# Patient Record
Sex: FEMALE | Race: WHITE
[De-identification: names, ages, dates, MRNs, and addresses within clinical notes are randomized per-mention and may not be internally consistent; named-entity substitution may affect disease eponyms.]

---

## 2018-05-24 ENCOUNTER — HOSPITAL ENCOUNTER (EMERGENCY)
Dept: HOSPITAL 74 - FER | Age: 33
Discharge: HOME | End: 2018-05-24
Payer: COMMERCIAL

## 2018-05-24 VITALS — HEART RATE: 84 BPM | SYSTOLIC BLOOD PRESSURE: 115 MMHG | TEMPERATURE: 98.5 F | DIASTOLIC BLOOD PRESSURE: 89 MMHG

## 2018-05-24 VITALS — BODY MASS INDEX: 20.9 KG/M2

## 2018-05-24 DIAGNOSIS — G43.009: Primary | ICD-10-CM

## 2018-05-24 LAB
ALBUMIN SERPL-MCNC: 4.3 G/DL (ref 3.5–5)
ALP SERPL-CCNC: 60 U/L (ref 32–92)
ALT SERPL-CCNC: 17 U/L (ref 10–40)
ANION GAP SERPL CALC-SCNC: 6 MMOL/L (ref 8–16)
AST SERPL-CCNC: 21 U/L (ref 10–42)
BASOPHILS # BLD: 0.4 % (ref 0–2)
BILIRUB SERPL-MCNC: < 0.5 MG/DL (ref 0.2–1)
BUN SERPL-MCNC: 16 MG/DL (ref 7–18)
CALCIUM SERPL-MCNC: 9.8 MG/DL (ref 8.4–10.2)
CHLORIDE SERPL-SCNC: 99 MMOL/L (ref 98–107)
CO2 SERPL-SCNC: 31 MMOL/L (ref 22–28)
CREAT SERPL-MCNC: < 0.8 MG/DL (ref 0.6–1.3)
DEPRECATED RDW RBC AUTO: 10.9 % (ref 11.6–15.6)
EOSINOPHIL # BLD: 0.5 % (ref 0–4.5)
GLUCOSE SERPL-MCNC: 84 MG/DL (ref 74–106)
HCT VFR BLD CALC: 39.3 % (ref 32.4–45.2)
HGB BLD-MCNC: 14 GM/DL (ref 10.7–15.3)
LYMPHOCYTES # BLD: 16.8 % (ref 8–40)
MCH RBC QN AUTO: 34.8 PG (ref 25.7–33.7)
MCHC RBC AUTO-ENTMCNC: 35.7 G/DL (ref 32–36)
MCV RBC: 97.5 FL (ref 80–96)
MONOCYTES # BLD AUTO: 6.2 % (ref 3.8–10.2)
NEUTROPHILS # BLD: 76.1 % (ref 42.8–82.8)
PLATELET # BLD AUTO: 325 K/MM3 (ref 134–434)
PMV BLD: 7.9 FL (ref 7.5–11.1)
POTASSIUM SERPLBLD-SCNC: 3.7 MMOL/L (ref 3.5–5.1)
PROT SERPL-MCNC: 6.8 G/DL (ref 6.4–8.3)
RBC # BLD AUTO: 4.03 M/MM3 (ref 3.6–5.2)
SODIUM SERPL-SCNC: 136 MMOL/L (ref 136–145)
WBC # BLD AUTO: 13.9 K/MM3 (ref 4–10.8)

## 2018-05-24 PROCEDURE — 3E033GC INTRODUCTION OF OTHER THERAPEUTIC SUBSTANCE INTO PERIPHERAL VEIN, PERCUTANEOUS APPROACH: ICD-10-PCS

## 2018-05-24 PROCEDURE — 3E0337Z INTRODUCTION OF ELECTROLYTIC AND WATER BALANCE SUBSTANCE INTO PERIPHERAL VEIN, PERCUTANEOUS APPROACH: ICD-10-PCS

## 2018-05-24 PROCEDURE — 3E0333Z INTRODUCTION OF ANTI-INFLAMMATORY INTO PERIPHERAL VEIN, PERCUTANEOUS APPROACH: ICD-10-PCS

## 2018-05-24 NOTE — PDOC
History of Present Illness





- General


History Source: Patient, Spouse


Exam Limitations: No Limitations





- History of Present Illness


Initial Comments: 





05/24/18 15:36


The patient is a 32 year old female accompanied with her spouse, with no 

significant past medical history, who presents to the emergency department for 

evaluation of migraines. The patient reports moderate migraine pain for  1 

week. The patient reports her migraine started on her vacation in [place] 

last Thursday 5/17. The patient reports visiting an urgent care in [place] 

and was given Toradol on Saturday. She reports visiting an urgent care upon her 

return from vacation on Monday for her headache and blurry vision and was 

advised to follow up with her neurologist. The patient reports visiting her 

neurologist (Dr. Grossman) on Tuesday and was given steroids/fioricet. The 

patient reports associated symptoms of headache and photophobia. The patient 

denies loss of consciousness. 





Allergies: NKDA 


Social History: Alcohol consumption reported (daily glass of wine). No reported 

cigarette smoking or drug use. 


Surgical History: Kidney stone, knee.








<Ruth Briscoe - Last Filed: 05/24/18 15:36>





<Elena Maldonado S - Last Filed: 05/24/18 17:44>





- General


Chief Complaint: Migraine Headache


Stated Complaint: migraine


Time Seen by Provider: 05/24/18 14:08





Past History





<Ruth Briscoe - Last Filed: 05/24/18 15:36>





- Past Medical History


COPD: No


DVT: No





- Suicide/Smoking/Psychosocial Hx


Smoking History: Never smoked


Have you smoked in the past 12 months: No


Hx Alcohol Use: Yes


Drug/Substance Use Hx: No


Substance Use Type: Alcohol





<Elena Maldonado S - Last Filed: 05/24/18 17:44>





- Past Medical History


Allergies/Adverse Reactions: 


 Allergies











Allergy/AdvReac Type Severity Reaction Status Date / Time


 


No Known Allergies Allergy   Unverified 05/19/14 22:52











Home Medications: 


Ambulatory Orders





Diphenhydramine HCl [Benadryl -] 25 mg PO Q8H #21 capsule 05/24/18 


Naproxen [Naprosyn] 500 mg PO BID #20 tablet 05/24/18 


Ondansetron [Ondansetron Odt] 8 mg PO BID #14 tab.rapdis 05/24/18 











**Review of Systems





- Review of Systems


Able to Perform ROS?: Yes


Is the patient limited English proficient: No


Constitutional: No: Symptoms Reported, See HPI, Chills, Diaphoresis, Fever, 

Loss of Appetite, Malaise, Night Sweats, Weakness, Weight Stable, Unintentional 

Wgt. Loss, Unexplained wgt Loss, Other


HEENTM: No: Symptoms Reported, See HPI, Eye Pain, Blurred Vision, Tearing, 

Recent change in vision, Double Vision, Cataracts, Ear Pain, Ocular Prothesis, 

Ear Discharge, Nose Pain, Nose Congestion, Tinnitus, Nose Bleeding, Hearing Loss

, Throat Pain, Throat Swelling, Mouth Pain, Dental Problems, Difficulty 

Swallowing, Mouth Swelling, Other


Respiratory: No: Orthopnea, Shortness of Breath, SOB with Exertion, SOB at Rest

, Stridor, Wheezing, Productive cough


Cardiac (ROS): No: Chest Pain, Chest Tightness


ABD/GI: No: Abdominal Distended, Abd. Pain w/ defecation, Blood Streaked Bowels

, Constipated, Diarrhea, Difficulty Swallowing, Nausea, Poor Appetite, Poor 

Fluid Intake, Rectal Bleeding, Vomiting, Abdominal cramping, Tarry Stools


: No: Burning, Dysuria, Discharge, Frequency, Flank Pain, Hematuria, 

Incontinence, Urgency


Musculoskeletal: No: Symptoms Reported, See HPI, Back Pain, Gout, Joint Pain, 

Joint Swelling, Muscle Pain, Muscle Weakness, Neck Pain, Joint Stiffness, Other


Integumentary: No: Symptoms Reported, See HPI, Bruising, Change in Color, 

Change in Hair/Nails, Dryness, Erythema, Flushing, Lesions, Lumps, Pallor, 

Pruritus, Rash, Sweating, Other


Neurological: Yes: Headache.  No: Symptoms reported, See HPI, Numbness, 

Paresthesia, Pre-Existing Deficit, Seizure, Tingling, Tremors, Weakness, 

Unsteady Gait, Ataxia, Dizziness, Other


Psychiatric: No: Anxiety, Depression, Sleep Pattern Change


Endocrine: No: Excessive Sweating, Flushing





<Ruth Briscoe - Last Filed: 05/24/18 15:36>





*Physical Exam





- Vital Signs


 Last Vital Signs











Temp Pulse Resp BP Pulse Ox


 


 98.5 F   84   16   115/89   100 


 


 05/24/18 14:04  05/24/18 14:04  05/24/18 14:04  05/24/18 14:04  05/24/18 14:04














- Physical Exam


Comments: 


GENERAL:


Well developed, well nourished. Awake and alert. No acute distress.


HEENT:


Normocephalic, atraumatic. PERRLA, EOMI. No conjunctival pallor. Sclera are non-

icteric. Moist mucous membranes. Oropharynx is clear.


NECK: 


Supple. Full ROM. No JVD. Carotid pulses 2+ and symmetric, without bruits. No 

thyromegaly. No lymphadenopathy.


CARDIOVASCULAR:


Regular rate and rhythm. No murmurs, rubs, or gallops. Distal pulses are 2+ and 

symmetric. 


PULMONARY: 


No evidence of respiratory distress. Lungs clear to auscultation bilaterally. 

No wheezing, rales or rhonchi.


ABDOMINAL:


Soft. Non-tender. Non-distended. No rebound or guarding. No organomegaly. 

Normoactive bowel sounds. 


MUSCULOSKELETAL 


Normal range of motion at all joints. No bony deformities or tenderness. No CVA 

tenderness.


EXTREMITIES: 


No cyanosis. No clubbing. No edema. No calf tenderness.


SKIN: 


Warm and dry. Normal capillary refill. No rashes. No jaundice. 


NEUROLOGICAL: 


Alert, awake, appropriate. Cranial nerves 2-12 intact. No deficits to light 

touch and temperature in face, upper extremities and lower extremities. No 

motor deficits in the in face, upper extremities and lower extremities. 

Normoreflexic in the upper and lower extremities. Normal speech. Toes are down-

going bilaterally. 


PSYCHIATRIC: 


Cooperative. Good eye contact. Appropriate mood and affect.





General Appearance: Yes: Nourished, Appropriately Dressed


HEENT: positive: EOMI, HENRIQUE, Normal ENT Inspection, Normal Voice, TMs Normal, 

Pharynx Normal


Neck: positive: Supple


Respiratory/Chest: positive: Lungs Clear, Normal Breath Sounds


Cardiovascular: positive: Regular Rhythm, Regular Rate


Gastrointestinal/Abdominal: positive: Normal Bowel Sounds, Soft


Musculoskeletal: positive: Normal Inspection.  negative: CVA Tenderness


Extremity: positive: Normal Capillary Refill, Normal Inspection, Normal Range 

of Motion


Integumentary: positive: Normal Color, Warm


Neurologic: positive: CNs II-XII NML intact, Fully Oriented, Alert, Normal Mood/

Affect, Normal Response, Motor Strength 5/5





<Ruth Briscoe - Last Filed: 05/24/18 15:36>





- Vital Signs


 Last Vital Signs











Temp Pulse Resp BP Pulse Ox


 


 98.5 F   84   16   115/89   100 


 


 05/24/18 14:04  05/24/18 14:04  05/24/18 14:04  05/24/18 14:04  05/24/18 14:04














<Elena Maldonado - Last Filed: 05/24/18 17:44>





ED Treatment Course





- LABORATORY


CBC & Chemistry Diagram: 


 05/24/18 15:30





 05/24/18 15:39





<Elena Maldonado - Last Filed: 05/24/18 17:44>





*DC/Admit/Observation/Transfer





- Attestations


Scribe Attestion: 





Documentation prepared by Ruth Briscoe, acting as medical scribe for Elena Maldonado MD.





<Ruth Briscoe - Last Filed: 05/24/18 15:36>





- Discharge Dispostion


Decision to Admit order: No





<Elena Maldonado - Last Filed: 05/24/18 17:44>


Diagnosis at time of Disposition: 


Migraine


Qualifiers:


 Migraine type: without aura Status migrainosus presence: without status 

migrainosus Intractability: not intractable Qualified Code(s): G43.009 - 

Migraine without aura, not intractable, without status migrainosus








- Discharge Dispostion


Disposition: HOME


Condition at time of disposition: Improved





- Prescriptions


Prescriptions: 


Diphenhydramine HCl [Benadryl -] 25 mg PO Q8H #21 capsule


Naproxen [Naprosyn] 500 mg PO BID #20 tablet


Ondansetron [Ondansetron Odt] 8 mg PO BID #14 tab.rapdis





- Referrals


Referrals: 


Cornel Houston [Non Staff, Medical] - 





- Patient Instructions


Printed Discharge Instructions:  DI for Foot Sprain





- Post Discharge Activity


Forms/Work/School Notes:  Back to Work

## 2018-05-24 NOTE — PDOC
*Physical Exam





- Vital Signs


 Last Vital Signs











Temp Pulse Resp BP Pulse Ox


 


 98.5 F   84   16   115/89   100 


 


 05/24/18 14:04  05/24/18 14:04  05/24/18 14:04  05/24/18 14:04  05/24/18 14:04














ED Treatment Course





- LABORATORY


CBC & Chemistry Diagram: 


 05/24/18 15:30





 05/24/18 15:39





- ADDITIONAL ORDERS


Additional order review: 


 Laboratory  Results











  05/24/18





  15:39


 


Sodium  136


 


Potassium  3.7


 


Chloride  99


 


Carbon Dioxide  31 H


 


Anion Gap  6 L


 


BUN  16


 


Creatinine  < 0.8


 


Creat Clearance w eGFR  > 60


 


Random Glucose  84


 


Calcium  9.8


 


Total Bilirubin  < 0.5


 


AST  21


 


ALT  17


 


Alkaline Phosphatase  60


 


Total Protein  6.8


 


Albumin  4.3








 











  05/24/18





  15:30


 


RBC  4.03


 


MCV  97.5 H


 


MCHC  35.7


 


RDW  10.9 L


 


MPV  7.9


 


Neutrophils %  76.1


 


Lymphocytes %  16.8


 


Monocytes %  6.2


 


Eosinophils %  0.5


 


Basophils %  0.4














- RADIOLOGY


Radiology Studies Ordered: 














 Category Date Time Status


 


 HEAD CT WITHOUT CONTRAST [CT] Stat CT Scan  05/24/18 15:31 Completed














- Medications


Given in the ED: 


ED Medications














Discontinued Medications














Generic Name Dose Route Start Last Admin





  Trade Name Freq  PRN Reason Stop Dose Admin


 


Diphenhydramine HCl  25 mg  05/24/18 15:33  05/24/18 15:51





  Benadryl Injection -  IVPUSH  05/24/18 15:34  25 mg





  ONCE ONE   Administration





     





     





     





     


 


Sodium Chloride  1,000 mls @ 1,000 mls/hr  05/24/18 15:34  05/24/18 15:51





  Normal Saline -  IV  05/24/18 16:33  1,000 mls/hr





  ASDIR STA   Administration





     





     





     





     


 


Ketorolac Tromethamine  30 mg  05/24/18 15:32  05/24/18 15:51





  Toradol Injection -  IVPUSH  05/24/18 15:33  30 mg





  ONCE ONE   Administration





     





     





     





     


 


Ondansetron HCl  4 mg  05/24/18 15:33  05/24/18 15:51





  Zofran Injection  IVPUSH  05/24/18 15:34  4 mg





  ONCE ONE   Administration





     





     





     





     














Progress Note





- Progress Note


Progress Note: 


As a typo error , discharge instructions were written but not handled to the 

patient as foot sprain as well.


Patient has been informed about correct diagnosis and follow up care.








*DC/Admit/Observation/Transfer


Diagnosis at time of Disposition: 


 Migraine








- Discharge Dispostion


Disposition: HOME


Condition at time of disposition: Improved





- Prescriptions


Prescriptions: 


Diphenhydramine HCl [Benadryl -] 25 mg PO Q8H #21 capsule


Naproxen [Naprosyn] 500 mg PO BID #20 tablet


Ondansetron [Ondansetron Odt] 8 mg PO BID #14 tab.rapdis





- Referrals


Referrals: 


Cornel Houston [Non Staff, Medical] - 





- Patient Instructions


Printed Discharge Instructions:  Migraine -- Adult, DI for Moderate Sedation, 

DI for Foot Sprain





- Post Discharge Activity


Forms/Work/School Notes:  Back to Work

## 2018-09-26 ENCOUNTER — HOSPITAL ENCOUNTER (EMERGENCY)
Dept: HOSPITAL 74 - FER | Age: 33
Discharge: HOME | End: 2018-09-26
Payer: COMMERCIAL

## 2018-09-26 VITALS — HEART RATE: 102 BPM | DIASTOLIC BLOOD PRESSURE: 99 MMHG | SYSTOLIC BLOOD PRESSURE: 146 MMHG

## 2018-09-26 VITALS — BODY MASS INDEX: 21.6 KG/M2

## 2018-09-26 VITALS — TEMPERATURE: 98.1 F

## 2018-09-26 DIAGNOSIS — N20.0: Primary | ICD-10-CM

## 2018-09-26 DIAGNOSIS — I10: ICD-10-CM

## 2018-09-26 LAB
ALBUMIN SERPL-MCNC: 4.2 G/DL (ref 3.5–5)
ALP SERPL-CCNC: 71 U/L (ref 32–92)
ALT SERPL-CCNC: 30 U/L (ref 10–40)
ANION GAP SERPL CALC-SCNC: 8 MMOL/L (ref 8–16)
AST SERPL-CCNC: 33 U/L (ref 10–42)
BACTERIA #/AREA URNS HPF: (no result) /HPF
BASOPHILS # BLD: 0.2 % (ref 0–2)
BILIRUB SERPL-MCNC: 0.8 MG/DL (ref 0.2–1)
BUN SERPL-MCNC: 11 MG/DL (ref 7–18)
CALCIUM SERPL-MCNC: 9.3 MG/DL (ref 8.4–10.2)
CHLORIDE SERPL-SCNC: 101 MMOL/L (ref 98–107)
CO2 SERPL-SCNC: 28 MMOL/L (ref 22–28)
CREAT SERPL-MCNC: < 0.6 MG/DL (ref 0.6–1.3)
DEPRECATED RDW RBC AUTO: 11.6 % (ref 11.6–15.6)
EOSINOPHIL # BLD: 1.2 % (ref 0–4.5)
EPITH CASTS URNS QL MICRO: (no result) /HPF
GLUCOSE SERPL-MCNC: 112 MG/DL (ref 74–106)
HCT VFR BLD CALC: 42.5 % (ref 32.4–45.2)
HGB BLD-MCNC: 14.2 GM/DL (ref 10.7–15.3)
LYMPHOCYTES # BLD: 15.9 % (ref 8–40)
MCH RBC QN AUTO: 33.9 PG (ref 25.7–33.7)
MCHC RBC AUTO-ENTMCNC: 33.5 G/DL (ref 32–36)
MCV RBC: 101.3 FL (ref 80–96)
MONOCYTES # BLD AUTO: 5.6 % (ref 3.8–10.2)
NEUTROPHILS # BLD: 77.1 % (ref 42.8–82.8)
PH UR: 6 [PH] (ref 4.5–8)
PLATELET # BLD AUTO: 280 K/MM3 (ref 134–434)
PMV BLD: 7.9 FL (ref 7.5–11.1)
POTASSIUM SERPLBLD-SCNC: 3 MMOL/L (ref 3.5–5.1)
PROT SERPL-MCNC: 7 G/DL (ref 6.4–8.3)
PROT UR QL STRIP: (no result)
RBC # BLD AUTO: (no result) /HPF (ref 0–3)
RBC # BLD AUTO: 4.19 M/MM3 (ref 3.6–5.2)
SODIUM SERPL-SCNC: 137 MMOL/L (ref 136–145)
SP GR UR: 1.01 (ref 1–1.02)
UROBILINOGEN UR STRIP-MCNC: 0.2 MG/DL (ref 0.2–1)
WBC # BLD AUTO: 14.9 K/MM3 (ref 4–10.8)
WBC # UR AUTO: (no result) /UL (ref 0–5)

## 2018-09-26 PROCEDURE — 3E033NZ INTRODUCTION OF ANALGESICS, HYPNOTICS, SEDATIVES INTO PERIPHERAL VEIN, PERCUTANEOUS APPROACH: ICD-10-PCS

## 2018-09-26 PROCEDURE — 3E0333Z INTRODUCTION OF ANTI-INFLAMMATORY INTO PERIPHERAL VEIN, PERCUTANEOUS APPROACH: ICD-10-PCS

## 2018-09-26 PROCEDURE — 3E0337Z INTRODUCTION OF ELECTROLYTIC AND WATER BALANCE SUBSTANCE INTO PERIPHERAL VEIN, PERCUTANEOUS APPROACH: ICD-10-PCS

## 2018-09-26 NOTE — PDOC
History of Present Illness





- General


Chief Complaint: Pain


Stated Complaint: LEFT FLANK PAIN


Time Seen by Provider: 09/26/18 15:15





- History of Present Illness


Initial Comments: 





09/26/18 15:36


34 yo F w a hx of multiple kidney stones, and a family hx of kidney stones 

presents to the ED with Left sided intense flank pain that she states is a 

kidney stone. Her last stone was 3 years ago and she says the pain feels 

exactly the same as it did in the past when she has passed small stones. She 

denies radiation to her groin. She took 400 mg of ibuprofen this morning for 

the pain and hoped it would pass but it didn't so she came to the ED. 


She denies recent fevers, chills, or infections. Denies dysuria, frequency, or 

urgency. She denies any gynecological hx, denies any hx of ovarian cysts, 

denies the possibility of being pregnant. 











Past History





- Past Medical History


Allergies/Adverse Reactions: 


 Allergies











Allergy/AdvReac Type Severity Reaction Status Date / Time


 


No Known Allergies Allergy   Verified 09/26/18 14:59











Home Medications: 


Ambulatory Orders





Atenolol [Tenormin -] 25 mg PO DAILY 09/26/18 


Hydrochlorothiazide [Hctz -] 12.5 mg PO DAILY 09/26/18 


Ibuprofen [Motrin -] 400 mg PO PRN 09/26/18 


Norethindrone 1 tab PO DAILY 09/26/18 


Ondansetron [Zofran Odt -] 4 mg SL TID PRN #10 od.tablet 09/26/18 


Tramadol HCl 50 mg PO QID PRN #8 tablet MDD 4 09/26/18 








COPD: No


DVT: No


HTN: Yes





- Suicide/Smoking/Psychosocial Hx


Smoking History: Never smoked


Have you smoked in the past 12 months: No


Information on smoking cessation initiated: No


Hx Alcohol Use: Yes (WINE WITH DINNER)


Drug/Substance Use Hx: No


Substance Use Type: Alcohol





**Review of Systems





- Review of Systems


Comments:: 





09/26/18 15:39








CONSTITUTIONAL:


Absent: fever, chills, diaphoresis, generalized weakness, malaise, loss of 

appetite


HEENT:


Absent: rhinorrhea, nasal congestion, throat pain, throat swelling, difficulty 

swallowing,


mouth swelling, ear pain, eye pain, visual Changes


CARDIOVASCULAR:


Absent: chest pain, syncope, palpitations, irregular heart rate, lightheadedness

, peripheral


edema


RESPIRATORY:


Absent: cough, shortness of breath, dyspnea with exertion, orthopnea, wheezing, 

stridor,


hemoptysis


GASTROINTESTINAL:


Present: Nausea


Absent: abdominal pain, abdominal distension, vomiting, diarrhea, constipation,


melena, hematochezia


GENITOURINARY:


Present: flank pain


Absent: dysuria, frequency, urgency, hesitancy, hematuria, genital pain


MUSCULOSKELETAL:


Absent: myalgia, arthralgia, joint swelling


SKIN:


Absent: rash, itching, pallor


HEMATOLOGIC/IMMUNOLOGIC:


Absent: easy bleeding, easy bruising, lymphadenopathy, frequent infections


ENDOCRINE:


Absent: unexplained weight gain, unexplained weight loss, heat intolerance, 

cold intolerance


NEUROLOGIC:


Absent: headache, focal weakness or paresthesias, dizziness, unsteady gait, 

seizure, mental


status changes, bladder or bowel incontinence


PSYCHIATRIC:


Absent: anxiety, depression, suicidal or homicidal ideation, hallucinations.





*Physical Exam





- Vital Signs


 Last Vital Signs











Temp Pulse Resp BP Pulse Ox


 


 98.1 F   114 H  20   158/114 H  100 


 


 09/26/18 14:59  09/26/18 14:59  09/26/18 14:59  09/26/18 14:59  09/26/18 14:59














- Physical Exam


Comments: 





09/26/18 15:41





GENERAL:


Well developed, well nourished. Awake and alert. Patient is in moderate 

distress.


HEENT:


Normocephalic, atraumatic. PERRLA, EOMI. No conjunctival pallor. Sclera are non-

icteric.


Moist mucous membranes. Oropharynx is clear.


NECK:


Supple. Full ROM. No JVD. No thyromegaly. No lymphadenopathy.


CARDIOVASCULAR:


Tachycardic rate and regular rhythm. No murmurs, rubs, or gallops. Distal 

pulses are 2+ and


symmetric.


PULMONARY:


No evidence of respiratory distress. Lungs clear to auscultation bilaterally. 

No wheezing,


rales or rhonchi.


ABDOMINAL:


Soft. Non-tender. Non-distended. No rebound or guarding. No organomegaly. 

Normoactive


bowel sounds.


MUSCULOSKELETAL


+Left sided CVA


Normal range of motion at all joints. No bony deformities or tenderness. 


EXTREMITIES:


No cyanosis. No clubbing. No edema. No calf tenderness.


SKIN:


Warm and dry. Normal capillary refill. No rashes. No jaundice.


NEUROLOGICAL:


Alert, awake, appropriate. Cranial nerves 2-12 intact. Normal speech. Gait is 

normal without ataxia.


PSYCHIATRIC:


Cooperative. Good eye contact. Appropriate mood and affect.





**Heart Score/ECG Review





- Electrocardiogram


EKG: Normal





- Age


Age: </= 45





- Risk Factors


Risk Factors Heart Score: Yes Hx Hypertension, No Positive family hx of cardiac 

disease, No Hx Obesity


Based on the list above the patient has:: 1-2 risk factors





- ECG Intrepretation


Rhythm: Regular Rhythm





- Axis


Axis: Normal





- ST and T


Non Specific ST-T Wave changes: No


Flattened T Waves: No


Prolonged Q-T Interval: No





- ECG Impressions


Normal ECG: Yes


Non-specific ST Elevation: No


Ischemic Changes: No


Tachycardia: Sinus





ED Treatment Course





- LABORATORY


CBC & Chemistry Diagram: 


 09/26/18 15:28





 09/26/18 15:28





Medical Decision Making





- Medical Decision Making





09/26/18 15:42


34 yo f w a hx of kidney stones presents with flank pain stating I have a 

kidney stone. She says it feels exactly like her prior stones have felt. She 

has positive L sided CVA tenderness. This is most likely a kidney stone. 





Highest on DD: Kidney stone, pyelonphritis, uti, ovarian cyst, ectopic pregnancy

, AAA. 





Plan: Cbc, Cmp, UA/UC, IVF - NS, Toradol, re-assess. 


-toradol didn't help her pain. giving 1000 tylenol and will re-assess. 


-urine supports kidney stone with 1+ blood, neg LE, neg nitrite. 


-Patient is still in significant pain after 1000 of tylenol. Will give 4 of 

morphine and order a CTAP. 


-Patient feels significant pain relief after 4 of morphine. 


-dispo will depend on CTAP read. 





CTAP showed small nonobstructing bilateral renal stones measuring up to 4 mm. 


No gross ureteral stone is identified, bilaterally. No urinary bladder stone is 

identified. 


There is a slightly prominent left ovary with focal low-attenuation density in 

its center. The radiologist cannot rule out a dominant follicle/cyst.





Patient is feeling better and would like to go home. She requests pain meds bc 

ibuprofen didn't seem to help her this morning. 


Will DC patient with a few days of tramadol for pain relief and uro follow up. 


09/26/18 19:02








*DC/Admit/Observation/Transfer


Diagnosis at time of Disposition: 


 Kidney stone on left side








- Discharge Dispostion


Disposition: HOME


Condition at time of disposition: Stable


Decision to Admit order: No





- Referrals


Referrals: 


Juan Ramon Haro MD [Staff Physician] - 


Sheridan Davis MD [Non Staff, Medical] - 


Jaime Grigsby MD [Non Staff, Medical] - 


Sarah Christianson MD [Non Staff, Medical] - 


John Castro MD [Non Staff, Medical] - 


Reji Carvajal MD [Non Staff, Medical] - 





- Patient Instructions


Printed Discharge Instructions:  Kidney Stones (Alternative Therapy), Kidney 

Stones -- Adult, Extracorporeal Shock Wave Lithotripsy


Additional Instructions: 


You came into the emergency room with flank pain. The cat scan showed small 

stones in both kidneys. We are sending a medication to your pharmacy to help 

with the pain control, please make sure to pick it up. It is very important 

that you schedule an appointment with a urologist in the next 3 to 5 days to 

better address this kidney stone problem and try to prevent future stones. 

Please come back to the ER immediately if your pain worsens, you develop a bad 

fever, cannot urinate or have any other new or worsening concerns. 





Thank you for coming to the [facility]. We hope you feel better soon. 


Print Language: ENGLISH





- Post Discharge Activity

## 2018-09-26 NOTE — PDOC
Attending Attestation





- Resident


Resident Name: Mauro Rajput





- ED Attending Attestation


I have performed the following: I have examined & evaluated the patient, The 

case was reviewed & discussed with the resident, I agree w/resident's findings 

& plan, Exceptions are as noted

## 2018-09-27 NOTE — EKG
Test Reason : 

Blood Pressure : ***/*** mmHG

Vent. Rate : 105 BPM     Atrial Rate : 105 BPM

   P-R Int : 176 ms          QRS Dur : 084 ms

    QT Int : 360 ms       P-R-T Axes : 065 072 045 degrees

   QTc Int : 475 ms

 

SINUS TACHYCARDIA

OTHERWISE NORMAL ECG

NO PREVIOUS ECGS AVAILABLE

Confirmed by GUILLERMO ROSADO, ALMA ([id number]) on 9/27/2018 9:38:39 AM

 

Referred By: MD ELIZABETH           Confirmed By:ALMA LI MD

## 2025-07-10 ENCOUNTER — HOSPITAL ENCOUNTER (OUTPATIENT)
Dept: HOSPITAL 74 - FER | Age: 40
Setting detail: OBSERVATION
LOS: 2 days | Discharge: HOME | End: 2025-07-12
Attending: STUDENT IN AN ORGANIZED HEALTH CARE EDUCATION/TRAINING PROGRAM | Admitting: STUDENT IN AN ORGANIZED HEALTH CARE EDUCATION/TRAINING PROGRAM
Payer: COMMERCIAL

## 2025-07-10 VITALS — BODY MASS INDEX: 18.8 KG/M2

## 2025-07-10 DIAGNOSIS — K52.9: ICD-10-CM

## 2025-07-10 DIAGNOSIS — Z88.8: ICD-10-CM

## 2025-07-10 DIAGNOSIS — E86.0: ICD-10-CM

## 2025-07-10 DIAGNOSIS — N20.0: ICD-10-CM

## 2025-07-10 DIAGNOSIS — E83.42: ICD-10-CM

## 2025-07-10 DIAGNOSIS — E87.20: Primary | ICD-10-CM

## 2025-07-10 DIAGNOSIS — E87.6: ICD-10-CM

## 2025-07-10 LAB
ALBUMIN SERPL-MCNC: 4.8 G/DL (ref 3.4–5)
ALP SERPL-CCNC: 63 U/L (ref 45–117)
ALT SERPL-CCNC: 19 U/L (ref 7–52)
AMORPH PHOS CRY URNS QL MICRO: (no result) /HPF
AMORPH PHOS CRY URNS QL MICRO: (no result) /HPF
AMORPH SED URNS QL MICRO: (no result)
AMPHET UR-MCNC: NEGATIVE NG/ML
ANION GAP SERPL CALC-SCNC: 13 MMOL/L (ref 4–13)
ANION GAP SERPL CALC-SCNC: 18 MMOL/L (ref 4–13)
AST SERPL-CCNC: 24 U/L (ref 15–37)
BARBITURATES UR-MCNC: NEGATIVE UG/ML
BASE EXCESS BLDV CALC-SCNC: 0.1 MMOL/L (ref -2–2)
BASOPHILS # BLD AUTO: (no result) X10^3/UL (ref 0.01–0.08)
BENZODIAZ UR SCN-MCNC: NEGATIVE NG/ML
BILIRUB SERPL-MCNC: 1.2 MG/DL (ref 0.2–1)
BUN SERPL-MCNC: 10 MG/DL (ref 7–18)
BUN SERPL-MCNC: 11 MG/DL (ref 7–18)
CALCIUM SERPL-MCNC: 9.2 MG/DL (ref 8.5–10.1)
CALCIUM SERPL-MCNC: 9.4 MG/DL (ref 8.5–10.1)
CAOX CRY URNS QL MICRO: (no result) /HPF
CHLORIDE SERPL-SCNC: 93 MMOL/L (ref 98–107)
CHLORIDE SERPL-SCNC: 94 MMOL/L (ref 98–107)
CO2 SERPL-SCNC: 24 MMOL/L (ref 21–32)
CO2 SERPL-SCNC: 29 MMOL/L (ref 21–32)
COCAINE UR-MCNC: NEGATIVE NG/ML
CREAT SERPL-MCNC: 0.8 MG/DL (ref 0.6–1.3)
CREAT SERPL-MCNC: 1 MG/DL (ref 0.6–1.3)
EOSINOPHIL # BLD AUTO: (no result) X10^3/UL (ref 0.04–0.36)
EOSINOPHIL NFR BLD AUTO: (no result) % (ref 0.7–5.8)
ERYTHROCYTE [DISTWIDTH] IN BLOOD: 11.6 % (ref 12.2–17.1)
GLUCOSE SERPL-MCNC: 119 MG/DL (ref 74–106)
GLUCOSE SERPL-MCNC: 131 MG/DL (ref 74–106)
GRAN CASTS URNS QL MICRO: (no result) /LPF
HCG UR QL: NEGATIVE
HCT VFR BLD CALC: 43 % (ref 34.1–44.9)
HCT VFR BLDV CALC: 48 % (ref 32.4–45.2)
HCV IGG SERPL QL IA: (no result)
HGB BLD-MCNC: 15.7 G/DL (ref 11.2–15.7)
HIV 1+2 AB+HIV1 P24 AG SERPL QL IA: NEGATIVE
HYALINE CASTS URNS QL MICRO: (no result) /LPF
IMM GRANULOCYTES # BLD: (no result) X10^3/UL (ref 0–0.03)
LACTATE SERPL-MCNC: 4.5 MMOL/L (ref 0.4–2)
LACTATE SERPL-MCNC: 5.6 MMOL/L (ref 0.4–2)
MCHC RBC-ENTMCNC: 36.5 G/DL (ref 32.2–35.5)
MCV RBC: 96.4 FL (ref 79.4–94.8)
METHADONE UR-MCNC: NEGATIVE UG/L
MONOCYTES # BLD AUTO: (no result) X10^3/UL (ref 0.24–0.86)
MONOCYTES NFR BLD AUTO: (no result) % (ref 4.7–12.5)
MUCOUS THREADS URNS QL MICRO: (no result)
OPIATES UR QL SCN: NEGATIVE
OTHER ELEMENTS URNS MICRO: (no result)
PCO2 BLDV: 39 MMHG (ref 38–52)
PCP UR QL SCN: NEGATIVE
PH BLDV: 7.42 [PH] (ref 7.31–7.41)
PLATELET # BLD AUTO: 332 X10^3/UL (ref 182–369)
PMV BLD: 8.8 FL (ref 9.4–12.3)
POTASSIUM SERPLBLD-SCNC: 2.5 MMOL/L (ref 3.5–5.1)
POTASSIUM SERPLBLD-SCNC: 2.9 MMOL/L (ref 3.5–5.1)
PROT SERPL-MCNC: 7.1 G/DL (ref 6.4–8.2)
RBC CASTS URNS QL MICRO: (no result)
SAO2 % BLDV: 94.6 % (ref 70–80)
SODIUM SERPL-SCNC: 135 MMOL/L (ref 136–145)
SODIUM SERPL-SCNC: 136 MMOL/L (ref 136–145)
SPERM # UR AUTO: (no result) /UL
T VAGINALIS URNS QL MICRO: (no result)
TRI-PHOS CRY URNS QL MICRO: (no result) /HPF
URATE CRY URNS QL MICRO: (no result) /HPF
WAXY CASTS # URNS: (no result) /ML
WBC CASTS URNS QL MICRO: (no result) /LPF

## 2025-07-10 PROCEDURE — G0378 HOSPITAL OBSERVATION PER HR: HCPCS

## 2025-07-10 PROCEDURE — 3E023GC INTRODUCTION OF OTHER THERAPEUTIC SUBSTANCE INTO MUSCLE, PERCUTANEOUS APPROACH: ICD-10-PCS | Performed by: STUDENT IN AN ORGANIZED HEALTH CARE EDUCATION/TRAINING PROGRAM

## 2025-07-10 PROCEDURE — 3E0337Z INTRODUCTION OF ELECTROLYTIC AND WATER BALANCE SUBSTANCE INTO PERIPHERAL VEIN, PERCUTANEOUS APPROACH: ICD-10-PCS | Performed by: STUDENT IN AN ORGANIZED HEALTH CARE EDUCATION/TRAINING PROGRAM

## 2025-07-10 PROCEDURE — 3E03329 INTRODUCTION OF OTHER ANTI-INFECTIVE INTO PERIPHERAL VEIN, PERCUTANEOUS APPROACH: ICD-10-PCS | Performed by: STUDENT IN AN ORGANIZED HEALTH CARE EDUCATION/TRAINING PROGRAM

## 2025-07-10 RX ADMIN — SODIUM CHLORIDE ONE: 9 INJECTION, SOLUTION INTRAVENOUS at 17:16

## 2025-07-10 RX ADMIN — POTASSIUM CHLORIDE SCH MLS/HR: 149 INJECTION, SOLUTION, CONCENTRATE INTRAVENOUS at 17:08

## 2025-07-10 RX ADMIN — POTASSIUM CHLORIDE ONE MEQ: 20 SOLUTION ORAL at 14:37

## 2025-07-10 RX ADMIN — ONDANSETRON ONE: 2 INJECTION INTRAMUSCULAR; INTRAVENOUS at 17:16

## 2025-07-10 RX ADMIN — POTASSIUM CHLORIDE SCH MLS/HR: 7.46 INJECTION, SOLUTION INTRAVENOUS at 14:37

## 2025-07-11 LAB
ALBUMIN SERPL-MCNC: 3.6 G/DL (ref 3.4–5)
ALBUMIN SERPL-MCNC: 4 G/DL (ref 3.4–5)
ALP SERPL-CCNC: 49 U/L (ref 45–117)
ALP SERPL-CCNC: 62 U/L (ref 45–117)
ALT SERPL-CCNC: 17 U/L (ref 7–52)
ALT SERPL-CCNC: 24 U/L (ref 13–61)
ANION GAP SERPL CALC-SCNC: 11 MMOL/L (ref 4–13)
ANION GAP SERPL CALC-SCNC: 7 MMOL/L (ref 4–13)
AST SERPL-CCNC: 20 U/L (ref 15–37)
AST SERPL-CCNC: 21 U/L (ref 15–37)
BASOPHILS # BLD AUTO: 0.02 X10^3/UL (ref 0.01–0.08)
BILIRUB SERPL-MCNC: 0.7 MG/DL (ref 0.2–1)
BILIRUB SERPL-MCNC: 1 MG/DL (ref 0.2–1)
BUN SERPL-MCNC: 3 MG/DL (ref 7–18)
BUN SERPL-MCNC: 6.3 MG/DL (ref 7–18)
CALCIUM SERPL-MCNC: 8.6 MG/DL (ref 8.5–10.1)
CALCIUM SERPL-MCNC: 9 MG/DL (ref 8.5–10.1)
CHLORIDE SERPL-SCNC: 100 MMOL/L (ref 98–107)
CHLORIDE SERPL-SCNC: 97 MMOL/L (ref 98–107)
CO2 SERPL-SCNC: 29 MMOL/L (ref 21–32)
CO2 SERPL-SCNC: 35 MMOL/L (ref 21–32)
CREAT SERPL-MCNC: 0.5 MG/DL (ref 0.6–1.3)
CREAT SERPL-MCNC: 0.7 MG/DL (ref 0.55–1.3)
EOSINOPHIL # BLD AUTO: 0.03 X10^3/UL (ref 0.04–0.36)
EOSINOPHIL NFR BLD AUTO: 0.4 % (ref 0.7–5.8)
ERYTHROCYTE [DISTWIDTH] IN BLOOD: 11.9 % (ref 12.2–17.1)
GLUCOSE SERPL-MCNC: 123 MG/DL (ref 74–106)
GLUCOSE SERPL-MCNC: 92 MG/DL (ref 74–106)
HCT VFR BLD CALC: 34.6 % (ref 34.1–44.9)
HGB BLD-MCNC: 12.5 G/DL (ref 11.2–15.7)
IMM GRANULOCYTES # BLD: 0.03 X10^3/UL (ref 0–0.03)
LACTATE SERPL-MCNC: 3.4 MMOL/L (ref 0.4–2)
LACTATE SERPL-MCNC: 3.4 MMOL/L (ref 0.4–2)
LACTATE SERPL-MCNC: 3.6 MMOL/L (ref 0.4–2)
LACTATE SERPL-MCNC: 4.8 MMOL/L (ref 0.4–2)
LACTATE SERPL-MCNC: 5.7 MMOL/L (ref 0.4–2)
LACTATE SERPL-MCNC: 6.2 MMOL/L (ref 0.4–2)
MAGNESIUM SERPL-MCNC: 1.5 MG/DL (ref 1.8–2.4)
MAGNESIUM SERPL-MCNC: 1.7 MG/DL (ref 1.8–2.4)
MCHC RBC-ENTMCNC: 36.1 G/DL (ref 32.2–35.5)
MCV RBC: 98 FL (ref 79.4–94.8)
MONOCYTES # BLD AUTO: 1.07 X10^3/UL (ref 0.24–0.86)
MONOCYTES NFR BLD AUTO: 13.5 % (ref 4.7–12.5)
PHOSPHATE SERPL-MCNC: 2.2 MG/DL (ref 2.5–4.9)
PLATELET # BLD AUTO: 278 X10^3/UL (ref 182–369)
PMV BLD: 9 FL (ref 9.4–12.3)
POTASSIUM SERPLBLD-SCNC: 2.5 MMOL/L (ref 3.5–5.1)
POTASSIUM SERPLBLD-SCNC: 3.3 MMOL/L (ref 3.5–5.1)
PROT SERPL-MCNC: 5.8 G/DL (ref 6.4–8.2)
PROT SERPL-MCNC: 6 G/DL (ref 6.4–8.2)
SODIUM SERPL-SCNC: 139 MMOL/L (ref 136–145)
SODIUM SERPL-SCNC: 140 MMOL/L (ref 136–145)

## 2025-07-11 RX ADMIN — SODIUM CHLORIDE, POTASSIUM CHLORIDE, SODIUM LACTATE AND CALCIUM CHLORIDE SCH MLS/HR: 600; 310; 30; 20 INJECTION, SOLUTION INTRAVENOUS at 12:39

## 2025-07-11 RX ADMIN — POTASSIUM CHLORIDE ONE MEQ: 20 SOLUTION ORAL at 12:13

## 2025-07-11 RX ADMIN — METRONIDAZOLE SCH MLS/HR: 500 INJECTION, SOLUTION INTRAVENOUS at 12:12

## 2025-07-11 RX ADMIN — SODIUM CHLORIDE, POTASSIUM CHLORIDE, SODIUM LACTATE AND CALCIUM CHLORIDE SCH MLS/HR: 600; 310; 30; 20 INJECTION, SOLUTION INTRAVENOUS at 03:01

## 2025-07-11 RX ADMIN — SODIUM CHLORIDE, POTASSIUM CHLORIDE, SODIUM LACTATE AND CALCIUM CHLORIDE STA MLS/HR: 600; 310; 30; 20 INJECTION, SOLUTION INTRAVENOUS at 03:48

## 2025-07-11 RX ADMIN — POTASSIUM CHLORIDE ONE MEQ: 20 SOLUTION ORAL at 04:15

## 2025-07-11 RX ADMIN — POTASSIUM PHOSPHATE, MONOBASIC AND POTASSIUM PHOSPHATE, DIBASIC ONE MLS/HR: 224; 236 INJECTION, SOLUTION, CONCENTRATE INTRAVENOUS at 15:26

## 2025-07-11 RX ADMIN — Medication ONE MG: at 23:35

## 2025-07-11 RX ADMIN — CEFTRIAXONE ONE MLS/HR: 1 INJECTION, POWDER, FOR SOLUTION INTRAMUSCULAR; INTRAVENOUS at 12:12

## 2025-07-11 RX ADMIN — PIPERACILLIN AND TAZOBACTAM ONE: 4; .5 INJECTION, POWDER, LYOPHILIZED, FOR SOLUTION INTRAVENOUS at 07:06

## 2025-07-11 RX ADMIN — POTASSIUM CHLORIDE SCH MLS/HR: 7.46 INJECTION, SOLUTION INTRAVENOUS at 04:15

## 2025-07-11 RX ADMIN — CARVEDILOL SCH MG: 3.12 TABLET, FILM COATED ORAL at 12:13

## 2025-07-11 RX ADMIN — MAGNESIUM SULFATE HEPTAHYDRATE ONE MLS/HR: 40 INJECTION, SOLUTION INTRAVENOUS at 12:13

## 2025-07-11 RX ADMIN — SODIUM CHLORIDE, POTASSIUM CHLORIDE, SODIUM LACTATE AND CALCIUM CHLORIDE STA MLS/HR: 600; 310; 30; 20 INJECTION, SOLUTION INTRAVENOUS at 00:05

## 2025-07-12 VITALS
TEMPERATURE: 98.4 F | SYSTOLIC BLOOD PRESSURE: 116 MMHG | HEART RATE: 112 BPM | RESPIRATION RATE: 18 BRPM | DIASTOLIC BLOOD PRESSURE: 84 MMHG

## 2025-07-12 LAB
ANION GAP SERPL CALC-SCNC: 13 MMOL/L (ref 4–13)
BASOPHILS # BLD AUTO: 0.03 X10^3/UL (ref 0.01–0.08)
BUN SERPL-MCNC: 4 MG/DL (ref 7–18)
CALCIUM SERPL-MCNC: 9.8 MG/DL (ref 8.5–10.1)
CHLORIDE SERPL-SCNC: 97 MMOL/L (ref 98–107)
CO2 SERPL-SCNC: 32 MMOL/L (ref 21–32)
CREAT SERPL-MCNC: 0.6 MG/DL (ref 0.6–1.3)
EOSINOPHIL # BLD AUTO: 0.06 X10^3/UL (ref 0.04–0.36)
EOSINOPHIL NFR BLD AUTO: 1.1 % (ref 0.7–5.8)
ERYTHROCYTE [DISTWIDTH] IN BLOOD: 11.9 % (ref 12.2–17.1)
GLUCOSE SERPL-MCNC: 156 MG/DL (ref 74–106)
HCT VFR BLD CALC: 37 % (ref 34.1–44.9)
HGB BLD-MCNC: 12.8 G/DL (ref 11.2–15.7)
IMM GRANULOCYTES # BLD: 0.01 X10^3/UL (ref 0–0.03)
LACTATE SERPL-MCNC: 5.1 MMOL/L (ref 0.4–2)
MAGNESIUM SERPL-MCNC: 2 MG/DL (ref 1.8–2.4)
MCHC RBC-ENTMCNC: 34.6 G/DL (ref 32.2–35.5)
MCV RBC: 103.9 FL (ref 79.4–94.8)
MONOCYTES # BLD AUTO: 0.72 X10^3/UL (ref 0.24–0.86)
MONOCYTES NFR BLD AUTO: 13.3 % (ref 4.7–12.5)
PHOSPHATE SERPL-MCNC: 3.4 MG/DL (ref 2.5–4.9)
PLATELET # BLD AUTO: 273 X10^3/UL (ref 182–369)
PMV BLD: 8.9 FL (ref 9.4–12.3)
POTASSIUM SERPLBLD-SCNC: 4.2 MMOL/L (ref 3.5–5.1)
SODIUM SERPL-SCNC: 142 MMOL/L (ref 136–145)

## 2025-07-12 RX ADMIN — CEFTRIAXONE SCH MLS/HR: 1 INJECTION, POWDER, FOR SOLUTION INTRAMUSCULAR; INTRAVENOUS at 09:34

## 2025-07-12 RX ADMIN — ENOXAPARIN SODIUM SCH MG: 40 INJECTION SUBCUTANEOUS at 09:35

## 2025-07-14 LAB — RENIN PLAS-CCNC: 5.6 NG/ML/HR (ref 0.17–5.38)
